# Patient Record
Sex: FEMALE | Race: ASIAN | Employment: PART TIME | URBAN - METROPOLITAN AREA
[De-identification: names, ages, dates, MRNs, and addresses within clinical notes are randomized per-mention and may not be internally consistent; named-entity substitution may affect disease eponyms.]

---

## 2017-03-11 PROBLEM — F41.9 ANXIETY: Status: ACTIVE | Noted: 2017-03-11

## 2017-03-11 PROBLEM — F32.1 MODERATE MAJOR DEPRESSION (HCC): Status: ACTIVE | Noted: 2017-03-11

## 2017-03-11 PROCEDURE — 36415 COLL VENOUS BLD VENIPUNCTURE: CPT | Performed by: PHYSICIAN ASSISTANT

## 2017-03-11 PROCEDURE — 80050 GENERAL HEALTH PANEL: CPT | Performed by: PHYSICIAN ASSISTANT

## 2018-09-20 PROBLEM — F32.1 MODERATE MAJOR DEPRESSION (HCC): Status: RESOLVED | Noted: 2017-03-11 | Resolved: 2018-09-20

## 2018-09-20 PROBLEM — F41.9 ANXIETY: Status: RESOLVED | Noted: 2017-03-11 | Resolved: 2018-09-20

## 2021-12-08 ENCOUNTER — HOSPITAL ENCOUNTER (OUTPATIENT)
Age: 46
Discharge: HOME | End: 2021-12-08
Attending: EMERGENCY MEDICINE
Payer: COMMERCIAL

## 2021-12-08 VITALS
HEART RATE: 84 BPM | RESPIRATION RATE: 17 BRPM | WEIGHT: 145 LBS | OXYGEN SATURATION: 100 % | HEIGHT: 62 IN | BODY MASS INDEX: 26.68 KG/M2 | TEMPERATURE: 98 F | DIASTOLIC BLOOD PRESSURE: 65 MMHG | SYSTOLIC BLOOD PRESSURE: 102 MMHG

## 2021-12-08 DIAGNOSIS — L50.9 URTICARIA: Primary | ICD-10-CM

## 2021-12-08 PROCEDURE — 99213 OFFICE O/P EST LOW 20 MIN: CPT

## 2021-12-08 RX ORDER — HYDROXYZINE HYDROCHLORIDE 25 MG/1
TABLET, FILM COATED ORAL EVERY 4 HOURS PRN
Qty: 20 TABLET | Refills: 0 | Status: SHIPPED | OUTPATIENT
Start: 2021-12-08 | End: 2022-01-07

## 2021-12-08 RX ORDER — METHYLPREDNISOLONE 4 MG/1
TABLET ORAL
Qty: 1 EACH | Refills: 0 | Status: SHIPPED | OUTPATIENT
Start: 2021-12-08 | End: 2021-12-20 | Stop reason: ALTCHOICE

## 2021-12-08 NOTE — ED INITIAL ASSESSMENT (HPI)
Pt aox4. Pt c/o raised rash with itching started yesterday. Pt states has been taking Benadryl 25mg po Q 8 hrs yesterday. Pt denies KALEB, denies difficulty swallowing. Upon Rn assessment no swelling to face, tongue, lips.

## 2021-12-08 NOTE — ED PROVIDER NOTES
Patient Seen in: [facility]      History   Patient presents with:  Rash Skin Problem    Stated Complaint: hives-all over body    Subjective:   HPI    55-year-old female presents complaining of a 24-hour history of an itchy rash on her fac No data to display       Neck exam findings and treatment plan were discussed.          MDM      Urticarial rash                             Disposition and Plan     Clinical Impression:  Urticaria  (primary encounter diagnosis)     Disposition:  Discharge